# Patient Record
Sex: FEMALE | Race: WHITE | Employment: UNEMPLOYED | URBAN - METROPOLITAN AREA
[De-identification: names, ages, dates, MRNs, and addresses within clinical notes are randomized per-mention and may not be internally consistent; named-entity substitution may affect disease eponyms.]

---

## 2017-01-19 ENCOUNTER — HOSPITAL ENCOUNTER (EMERGENCY)
Facility: CLINIC | Age: 27
Discharge: ANOTHER HOSPITAL | End: 2017-01-19
Attending: EMERGENCY MEDICINE | Admitting: EMERGENCY MEDICINE
Payer: COMMERCIAL

## 2017-01-19 ENCOUNTER — HOSPITAL ENCOUNTER (INPATIENT)
Facility: CLINIC | Age: 27
LOS: 4 days | Discharge: HOME | DRG: 897 | End: 2017-01-23
Attending: PSYCHIATRY & NEUROLOGY | Admitting: PSYCHIATRY & NEUROLOGY
Payer: COMMERCIAL

## 2017-01-19 VITALS
HEIGHT: 67 IN | RESPIRATION RATE: 15 BRPM | OXYGEN SATURATION: 98 % | TEMPERATURE: 98.9 F | HEART RATE: 136 BPM | DIASTOLIC BLOOD PRESSURE: 92 MMHG | SYSTOLIC BLOOD PRESSURE: 139 MMHG

## 2017-01-19 DIAGNOSIS — F11.10 OPIATE ABUSE, CONTINUOUS (H): Primary | ICD-10-CM

## 2017-01-19 DIAGNOSIS — Z33.1 PREGNANT STATE, INCIDENTAL: ICD-10-CM

## 2017-01-19 DIAGNOSIS — F19.10 POLYSUBSTANCE ABUSE (H): ICD-10-CM

## 2017-01-19 LAB
ALBUMIN UR-MCNC: NEGATIVE MG/DL
AMPHETAMINES UR QL SCN: ABNORMAL
APPEARANCE UR: CLEAR
BARBITURATES UR QL: ABNORMAL
BENZODIAZ UR QL: ABNORMAL
BILIRUB UR QL STRIP: NEGATIVE
CANNABINOIDS UR QL SCN: ABNORMAL
COCAINE UR QL: ABNORMAL
COLOR UR AUTO: ABNORMAL
GLUCOSE UR STRIP-MCNC: NEGATIVE MG/DL
HGB UR QL STRIP: NEGATIVE
KETONES UR STRIP-MCNC: NEGATIVE MG/DL
LEUKOCYTE ESTERASE UR QL STRIP: NEGATIVE
NITRATE UR QL: NEGATIVE
OPIATES UR QL SCN: ABNORMAL
PCP UR QL SCN: ABNORMAL
PH UR STRIP: 6 PH (ref 5–7)
RBC #/AREA URNS AUTO: <1 /HPF (ref 0–2)
SP GR UR STRIP: 1 (ref 1–1.03)
URN SPEC COLLECT METH UR: ABNORMAL
UROBILINOGEN UR STRIP-MCNC: NORMAL MG/DL (ref 0–2)
WBC #/AREA URNS AUTO: <1 /HPF (ref 0–2)

## 2017-01-19 PROCEDURE — 12800008 ZZH R&B CD ADULT

## 2017-01-19 PROCEDURE — 25000132 ZZH RX MED GY IP 250 OP 250 PS 637: Performed by: EMERGENCY MEDICINE

## 2017-01-19 PROCEDURE — 12000001 ZZH R&B MED SURG/OB UMMC

## 2017-01-19 PROCEDURE — HZ2ZZZZ DETOXIFICATION SERVICES FOR SUBSTANCE ABUSE TREATMENT: ICD-10-PCS | Performed by: PSYCHIATRY & NEUROLOGY

## 2017-01-19 PROCEDURE — 81001 URINALYSIS AUTO W/SCOPE: CPT | Performed by: EMERGENCY MEDICINE

## 2017-01-19 PROCEDURE — 99285 EMERGENCY DEPT VISIT HI MDM: CPT

## 2017-01-19 PROCEDURE — 25000132 ZZH RX MED GY IP 250 OP 250 PS 637: Performed by: PSYCHIATRY & NEUROLOGY

## 2017-01-19 PROCEDURE — 80307 DRUG TEST PRSMV CHEM ANLYZR: CPT | Performed by: EMERGENCY MEDICINE

## 2017-01-19 PROCEDURE — 99223 1ST HOSP IP/OBS HIGH 75: CPT | Mod: AI | Performed by: PSYCHIATRY & NEUROLOGY

## 2017-01-19 RX ORDER — PRENATAL VIT/IRON FUM/FOLIC AC 27MG-0.8MG
1 TABLET ORAL DAILY
Status: DISCONTINUED | OUTPATIENT
Start: 2017-01-19 | End: 2017-01-23 | Stop reason: HOSPADM

## 2017-01-19 RX ORDER — BUPRENORPHINE 2 MG/1
2 TABLET SUBLINGUAL 4 TIMES DAILY PRN
Status: DISCONTINUED | OUTPATIENT
Start: 2017-01-19 | End: 2017-01-20

## 2017-01-19 RX ORDER — FOLIC ACID 1 MG/1
1 TABLET ORAL DAILY
Status: DISCONTINUED | OUTPATIENT
Start: 2017-01-19 | End: 2017-01-23 | Stop reason: HOSPADM

## 2017-01-19 RX ORDER — PRENATAL VIT/IRON FUM/FOLIC AC 27MG-0.8MG
1 TABLET ORAL DAILY
Status: ON HOLD | COMMUNITY
End: 2017-01-23

## 2017-01-19 RX ORDER — NALOXONE HYDROCHLORIDE 0.4 MG/ML
.1-.4 INJECTION, SOLUTION INTRAMUSCULAR; INTRAVENOUS; SUBCUTANEOUS
Status: DISCONTINUED | OUTPATIENT
Start: 2017-01-19 | End: 2017-01-23 | Stop reason: HOSPADM

## 2017-01-19 RX ADMIN — Medication 1 LOZENGE: at 09:24

## 2017-01-19 RX ADMIN — PRENATAL VIT W/ FE FUMARATE-FA TAB 27-0.8 MG 1 TABLET: 27-0.8 TAB at 15:05

## 2017-01-19 RX ADMIN — FOLIC ACID 1 MG: 1 TABLET ORAL at 15:05

## 2017-01-19 ASSESSMENT — ACTIVITIES OF DAILY LIVING (ADL)
ORAL_HYGIENE: INDEPENDENT
LAUNDRY: WITH SUPERVISION
LAUNDRY: WITH SUPERVISION
DRESS: SCRUBS (BEHAVIORAL HEALTH)
DRESS: INDEPENDENT
ORAL_HYGIENE: INDEPENDENT
GROOMING: INDEPENDENT
GROOMING: INDEPENDENT

## 2017-01-19 ASSESSMENT — ENCOUNTER SYMPTOMS
SHORTNESS OF BREATH: 0
ABDOMINAL PAIN: 0
COUGH: 1
FEVER: 0

## 2017-01-19 NOTE — PLAN OF CARE
Problem: Substance Withdrawal  Goal: Substance Withdrawal  Signs and symptoms of listed problems will be absent or manageable. 1) Patient will achieve medical stabilization of acute withdrawal sx.  2) Patient will remain safe and free from injury  3) Patient will demonstrate improvement of ADLs (appetite, hygiene)  4) Verbalize reduction of fear or anxiety to a manageable level.  5) Verbalize knowledge of substance abuse as a disease  6) Verbalize risks and negative effects related to drug ingestion  7) Demonstrate participation in unit programming and attends specific substance use group therapy (i.e AA meetings)  8) Accept referral to substance abuse treatment  9) Express sense of regaining some control of situation/life (possible by verbalizing alternative coping mechanisms as alternatives to substance use in response to stress)  Outcome: Declining  27 year old female admitted from [facility] ER on a transport hold. Pt is 17 weeks pregnant. Pt has been using heroin on the weekends x 1 month. Pt states she last used heroin 1-. Pt is poor historian on use amounts and history of use. Unable to ID amounts. Denies IV use. Smoke or snort.  Pt has also been using percoset and vicodin. Methamphetamine use off and on since age 18 years old. Utox + for amphetamines.  No history of previous detox's or CD treatments.   Denies MH history.   Pt denies SI/HI/SA.  Pt states she is motivated to get help and wants to go to treatment near her mother in [facility].   Pt was on taking amitriptyline and gabapentin. She has been told to no longer take these due to pregnancy.Pt has had one prenatal visit at 8 weeks pregnant.   Pt very restless during interview. Difficult to understand patients speech. Dr. Jones interviewed patient.   Plan: monitor mood withdrawal. See prn buprenorphine order. Ob consult. Status 15.
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
## 2017-01-19 NOTE — PROGRESS NOTES
I contacted Chica this pt's mother to recommend that this pt go to [facility] and make sure she completes any withdrawal before she goes home with her.  Chica did agree to this plan and agrees that it gives her more time to line up IP treatment in [place] for this pt after [facility].  I did assure this pt that her mom will come and get her and that this treatment plan is the best for her--this pt agrees and is fine that her mom will pick her up at [facility].  This pt kept voicing a concern about being safe at [facility] and I re-assured her that she will be safe at [facility]. Staff updated.
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
## 2017-01-19 NOTE — PLAN OF CARE
Problem: General Plan of Care (Inpatient Behavioral)  Goal: Individualization/Patient Specific Goal (IP Behavioral)  The patient and/or their representative will achieve their patient-specific goals related to the plan of care.    The patient-specific goals include: Patient will identify reason(s) for hospitalization from their perspective.  Patient will identify a goal for discharge.  Patient will identify triggers that may increase their risk for relapse.  Patient will identify coping skills they can do to stay well.   Patient will identify their support system to demonstrate readiness for discharge.  Outcome: No Change  Illnesses Management & Recovery  Patient identified being around drinking as trigger for relapse.  Patient identified keeping all of my outpatient appointments as a general wellness strategy.  Patient identified feeling cranky and being around using friends as a warning signs that they are in danger of relapse.  Patient identified Teofilo Brown and Chica Saez as someone they count on to get feedback from.  Patient identified being with supportive others as a way to take action when in danger of relapse.  Patient identified staying positive and keeping my appointment as  ways to cope with stress or other symptoms.

## 2017-01-19 NOTE — ED NOTES
"Patient voluntary came to ER seeking help for drug abuse in setting of pregnancy. Denies physical or emotional abuse but states \"I came here because I needed to be safe\". Writer asked why she felt unsafe but patient did not provide any information other than she is homeless living in a van or staying at house with lots of people. She admits to snorting heroin, abusing percocet, and taking her prescribed meds (gabapentin and amitriptyline) her OB doctor informed her not to. States they help with her anxiety more than anxiety meds. Patient appears very figidity but is cooperative and agreeable.  "

## 2017-01-19 NOTE — DISCHARGE INSTRUCTIONS
[employer] is a good resource for you and your children.  Please contact them at [phone].

## 2017-01-19 NOTE — IP AVS SNAPSHOT
[facility]    [address] [facility] [address]    Phone:  [phone]                                       After Visit Summary   1/19/2017    Adrianne Saez    MRN: [id number]           After Visit Summary Signature Page     I have received my discharge instructions, and my questions have been answered. I have discussed any challenges I see with this plan with the nurse or doctor.    ..........................................................................................................................................  Patient/Patient Representative Signature      ..........................................................................................................................................  Patient Representative Print Name and Relationship to Patient    ..................................................               ................................................  Date                                            Time    ..........................................................................................................................................  Reviewed by Signature/Title    ...................................................              ..............................................  Date                                                            Time

## 2017-01-19 NOTE — PROGRESS NOTES
I was asked if the ED provider can share this pt's ED record with her OB provider as this pt is pregnant and in the care of this OB provider. I did see by chart review that this pt came in looking for help with rule of 25 and a psych assessment.  She is currently approximately 8 weeks pregnant. I contacted patient relations re; the above and this pt freely signed our consent of service which includes sharing this ED visit with the appropriate provides also caring for this pt as a continuation of care.  I informed the ED provider.

## 2017-01-19 NOTE — IP AVS SNAPSHOT
MRN:[id number]                      After Visit Summary   1/19/2017    Adrianne Saez    MRN: [id number]           Thank you!     Thank you for choosing [facility] for your care. Our goal is always to provide you with excellent care.        Patient Information     Date Of Birth          1990        About your hospital stay     You were admitted on:  January 19, 2017 You last received care in the:  [facility]    You were discharged on:  January 23, 2017       Who to Call     For medical emergencies, please call 911.  For non-urgent questions about your medical care, please call your primary care provider or clinic, [phone]          Attending Provider     Provider    Vita Jones MD       Primary Care Provider Office Phone #    [facility] [phone]       No address on file        Your next 10 appointments already scheduled     Jan 30, 2017  7:30 PM   Treatment with [facility] ORIENTATION [facility] ([facility])    [address] [address] [phone]              Further instructions from your care team       Behavioral Discharge Planning and Instructions  THANK YOU FOR CHOOSING THE [facility]  3A  [phone]    Summary: You were admitted to Station 3A on 1/19/17 for detoxification from opiates.  A medical exam was performed that included lab work. You have met with a  and opted to continue in OP treatment and Suboxone maintenance.  Please take care and make your recovery a priority, Adrianne!     Suboxone maintenance:   Dr. Ramon Aquino  [facility]  [address] [place] [address]  Phone:  [phone]  Appointment:  Monday January 30th.  The time of the appointment is unknown.  If we cannot get the time prior to your discharge, you are to call the clinic tomorrow to confirm this.      Orientation for treatment at [facility]:    Monday January 30th at 7:30 PM  [address]  Room [address] (next door to [employer] [employer] [facility])  **If you are unable to attend orientation you must reschedule by calling [phone]** You MUST attend orientation before you can begin treatment.    You have been referred to [facility].  Please contact them with your new phone information at [phone].   [phone]      Main Diagnoses:  Per Dr. Jones:  1.  Opiate use disorder.    2.  Methamphetamine use disorder.    3.  Pregnancy.      Major Treatments, Procedures and Findings:  You were treated for opiate detoxification using Suboxone.  As an outpatient you will be prescribed Suboxone, please take this medication as prescribed until it is gone. You have had a chemical dependency assessment.  You have had labs drawn and those results have been reviewed with you. Please take a copy of your lab work with you to your next primary care physician appointment.    Symptoms to Report:  If you experience more anxiety, confusion, sleeplessness, deep sadness or thoughts of suicide, notify your treatment team or notify your primary care physician. IF ANY OF THE SYMPTOMS YOU ARE EXPERIENCING ARE A MEDICAL EMERGENCY CALL 911 IMMEDIATELY.     Lifestyle Adjustment: Adjust your lifestyle to get enough sleep, relaxation, exercise and good nutrition. Continue to develop healthy coping skills to decrease stress and promote a sober living environment. Do not use mood altering substances including alcohol, illegal drugs or addictive medications other than what is currently prescribed.     Follow-up Appointment:   [facility]   [address] [place] [address]  Phone:  [phone]  Appointment:  Monday January 30 th at 1:00 pm  Request a level 2 Ultrasound    Resources:    http://www.aastpaul.org/?topic=8  http://a[place].org/meetings/  http://www.nam[place].org/index.php/meeting-list-pdf  http://www.harmreduction.org  [facility] 899-501-2784  Support Group:  AA/NA and Sponsor/support  Crisis Intervention: 169.987.5571 or 237-998-6639 (TTY: 369.313.7832).  Call anytime for help.  National [place] on Mental Illness (www.[place].marie.org): 885.160.3277 or 928-909-8251.  Alcoholics Anonymous (www.alcoholics-anonymous.org): Check your phone book for your local chapter.  Suicide Awareness Voices of Education (SAVE) (www.save.org): 517-669-VMBW (7683)  National Suicide Prevention Line (www.mentalhealthmn.org): 400-743-VMWQ (7990)  Mental Health Consumer/Survivor Network of [place] (www.mhcsn.net): 215.456.8200 or 649-258-5239  Mental Health Association of [place] (www.mentalhealth.org): 512.849.2944 or 737-276-7116   Substance Abuse and Mental Health Services (www.samhsa.gov)    [place] Recovery Connection ([facility])  [facility] connects people seeking recovery to resources that help foster and sustain long-term recovery.  Whether you are seeking resources for treatment, transportation, housing, job training, education, health care or other pathways to recovery, [facility] is a great place to start.  447.159.7790.  www.[place]arecovery.org    General Medication Instructions:   See your medication sheet(s) for instructions.   Take all medicines as directed.  Make no changes unless your doctor suggests them.   Go to all your doctor visits.  Be sure to have all your required lab tests. This way, your medicines can be refilled on time.  AA/NA and Sponsors are excellent resources for support and you can find one at any support group meeting.  Do not use any form of alcohol.    Please Note:  If you have any questions at anytime after you are discharged please call the [facility] [place] detox unit 3AW unit at [phone].  [facility] , [facility], Behavioral  "Intake [phone]  Please take this discharge folder with you to all your follow up appointments, it contains your lab results, diagnosis, medication list and discharge recommendations.      THANK YOU FOR CHOOSING THE [facility]     The treatment team has appreciated the opportunity to work with you.  We wish you the best in the future.        Pending Results     No orders found from 2017 to 2017.            Statement of Approval     Ordered          17 1227  I have reviewed and agree with all the recommendations and orders detailed in this document.   EFFECTIVE NOW     Approved and electronically signed by:  Vita Jones MD             Admission Information        Provider Department Dept Phone    2017 Vita Jones MD Ur 3afh  [phone]      Your Vitals Were     Blood Pressure Pulse Temperature Respirations Height Weight    120/83 mmHg 94 98  F (36.7  C) (Oral) 16 1.676 m (5' 6\") 77.111 kg (170 lb)    BMI (Body Mass Index)                   27.45 kg/m2           [employer] Information     [employer] lets you send messages to your doctor, view your test results, renew your prescriptions, schedule appointments and more. To sign up, go to [email or web address] . Click on \"Log in\" on the left side of the screen, which will take you to the Welcome page. Then click on \"Sign up Now\" on the right side of the page.     You will be asked to enter the access code listed below, as well as some personal information. Please follow the directions to create your username and password.     Your access code is: [email or web address]  Expires: 2017  9:44 AM     Your access code will  in 90 days. If you need help or a new code, please call your [facility] clinic or [phone].        Care EveryWhere ID     This is your Care EveryWhere ID. This could be used by other organizations to access your [facility] medical records  [id number]           Review of your medicines    "   START taking        Dose / Directions    buprenorphine 2 MG Subl sublingual tablet   Commonly known as:  SUBUTEX   Used for:  Opiate abuse, continuous   Notes to Patient:  It has been given in the hospital at 8 am and 8 pm.        Dose:  2 mg   Place 1 tablet (2 mg) under the tongue daily   Quantity:  30 tablet   Refills:  0       folic acid 1 MG tablet   Commonly known as:  FOLVITE   Used for:  Opiate abuse, continuous        Dose:  1 mg   Take 1 tablet (1 mg) by mouth daily   Quantity:  30 tablet   Refills:  3         CONTINUE these medicines which have NOT CHANGED        Dose / Directions    NO ACTIVE MEDICATIONS        Refills:  0       prenatal multivitamin  plus iron 27-0.8 MG Tabs per tablet   Used for:  Opiate abuse, continuous        Dose:  1 tablet   Take 1 tablet by mouth daily   Quantity:  100 tablet   Refills:  1         STOP taking     AMITRIPTYLINE HCL PO           GABAPENTIN PO                Where to get your medicines      These medications were sent to [facility] [place], [place] - [address]  [address] [facility], [facility] [address]     Phone:  [phone]    - folic acid 1 MG tablet  - prenatal multivitamin  plus iron 27-0.8 MG Tabs per tablet      Some of these will need a paper prescription and others can be bought over the counter. Ask your nurse if you have questions.     Bring a paper prescription for each of these medications    - buprenorphine 2 MG Subl sublingual tablet             Protect others around you: Learn how to safely use, store and throw away your medicines at www.disposemymeds.org.             Medication List: This is a list of all your medications and when to take them. Check marks below indicate your daily home schedule. Keep this list as a reference.      Medications           Morning Afternoon Evening Bedtime As Needed    buprenorphine 2 MG Subl sublingual tablet   Commonly known as:  SUBUTEX   Place 1 tablet (2 mg) under the tongue daily    Last time this was given:  2 mg on 1/23/2017  9:50 AM   Notes to Patient:  It has been given in the hospital at 8 am and 8 pm.                                      folic acid 1 MG tablet   Commonly known as:  FOLVITE   Take 1 tablet (1 mg) by mouth daily   Last time this was given:  1 mg on 1/23/2017  8:24 AM                                   NO ACTIVE MEDICATIONS                                prenatal multivitamin  plus iron 27-0.8 MG Tabs per tablet   Take 1 tablet by mouth daily   Last time this was given:  1 tablet on 1/23/2017  8:24 AM

## 2017-01-19 NOTE — H&P
IDENTIFYING INFORMATION:  Adrianne Saez is a 27-year-old  female.  She is pregnant.  The patient is a 27-year-old  female, currently unemployed, homeless.  She has 2 children under the custody of ex-boyfriend's mother.      CHIEF COMPLAINT:  Heroin.      HISTORY OF PRESENT ILLNESS:  The patient is an extremely poor historian.  She came here to get help.  She has a longstanding history of abusing opiates, meth.  She has been using opiates for 5-6 years, mostly she is using Percocet and then has been using heroin.  Heroin she has been using it every weekend.  She snorts it.  She has tolerance to it.  She has tried to quit it.  She lost control over it.  She has used despite knowing that she is pregnant.  She is vague about amounts and frequency, saying she does not know how much she gets because her boyfriend supplies it for her.  She is also using meth, started at the age of 18 or 19.  She is vague about the amounts and frequency.  Her use has been progressive.  She has tried to quit using.  She does not use any drugs IV.  She has not drank alcohol since Christmas of 2015.  She uses pot occasionally.      The patient does not have any suicidal or homicidal ideation, plan or intent.  Does not have auditory or visual hallucinations, does not have any depression, does not have any rona, does not have any anxiety.  She takes gabapentin and Elavil even though told by her primary care not to take it.  The patient has been snorting heroin on weekends.  Last use was yesterday.  She has also been taking gabapentin for carpal tunnel syndrome.      PAST PSYCHIATRIC HISTORY:  Never psychiatrically hospitalized, never had any chemical dependency treatments, never made suicide attempts, never had any psychiatric issues.      PAST MEDICAL HISTORY:   She is pregnant and she has a history of hernia repair.      The patient's vitals are as below:   VITAL SIGNS:  Temperature of 98.5, pulse of 102, respiratory rate 16,  blood pressure of 143/91.      FAMILY HISTORY:  Mother has depression, dad  from drug overdose.      SOCIAL HISTORY:  She was born in [place].  She says she dropped out of 8th grade.  She is presently homeless, unemployed.  CPS has been involved.      MENTAL STATUS EXAMINATION:  The patient is a 27-year-old  female who appears under the influence.  She is disheveled, adequate grooming, adequate hygiene, maintains good eye contact, irritable.  Affect is congruent.  Speech is spontaneous.  Speech is low volume and mumbles her words, less logical in thinking, no loose association.  Insight and judgment are limited.  Alert and oriented x3.  Recent and remote memory, language, fund of knowledge are all less than adequate.  Does not have any suicidal or homicidal ideation, plan or intent.      DIAGNOSES:   Axis I:     1.  Opiate use disorder.   2.  Methamphetamine use disorder.   3.  Pregnancy.      PLAN:  The patient will be detoxed off meth   U-tox was positive for meth, symptomatic management.  The patient will be seen by case management and Internal Medicine.  Ob consultconsult and Internal Medicine consult.  We will put her on Subutex.  The patient may need it.  The patient is willing to do treatment.  We will try to get collateral information and go from there.         CELESTE JONES MD             D: 2017 14:39   T: 2017 16:40   MT: ROSALIND      Name:     SANDER LUCIANO   MRN:      [id number]        Account:      [id number]   :      1990           Admitted:     [id number]      Document: [id number]

## 2017-01-20 LAB
ALBUMIN SERPL-MCNC: 2.7 G/DL (ref 3.4–5)
ALP SERPL-CCNC: 68 U/L (ref 40–150)
ALT SERPL W P-5'-P-CCNC: 30 U/L (ref 0–50)
ANION GAP SERPL CALCULATED.3IONS-SCNC: 6 MMOL/L (ref 3–14)
AST SERPL W P-5'-P-CCNC: 19 U/L (ref 0–45)
BASOPHILS # BLD AUTO: 0 10E9/L (ref 0–0.2)
BASOPHILS NFR BLD AUTO: 0.2 %
BILIRUB SERPL-MCNC: 0.3 MG/DL (ref 0.2–1.3)
BUN SERPL-MCNC: 7 MG/DL (ref 7–30)
CALCIUM SERPL-MCNC: 8.6 MG/DL (ref 8.5–10.1)
CHLORIDE SERPL-SCNC: 109 MMOL/L (ref 94–109)
CO2 SERPL-SCNC: 25 MMOL/L (ref 20–32)
CREAT SERPL-MCNC: 0.66 MG/DL (ref 0.52–1.04)
DIFFERENTIAL METHOD BLD: ABNORMAL
EOSINOPHIL # BLD AUTO: 0.1 10E9/L (ref 0–0.7)
EOSINOPHIL NFR BLD AUTO: 1.4 %
ERYTHROCYTE [DISTWIDTH] IN BLOOD BY AUTOMATED COUNT: 12.9 % (ref 10–15)
GFR SERPL CREATININE-BSD FRML MDRD: ABNORMAL ML/MIN/1.7M2
GGT SERPL-CCNC: 9 U/L (ref 0–40)
GLUCOSE SERPL-MCNC: 76 MG/DL (ref 70–99)
HCT VFR BLD AUTO: 34.8 % (ref 35–47)
HGB BLD-MCNC: 11.9 G/DL (ref 11.7–15.7)
IMM GRANULOCYTES # BLD: 0 10E9/L (ref 0–0.4)
IMM GRANULOCYTES NFR BLD: 0.2 %
LYMPHOCYTES # BLD AUTO: 2.5 10E9/L (ref 0.8–5.3)
LYMPHOCYTES NFR BLD AUTO: 27.3 %
MCH RBC QN AUTO: 30.1 PG (ref 26.5–33)
MCHC RBC AUTO-ENTMCNC: 34.2 G/DL (ref 31.5–36.5)
MCV RBC AUTO: 88 FL (ref 78–100)
MONOCYTES # BLD AUTO: 0.7 10E9/L (ref 0–1.3)
MONOCYTES NFR BLD AUTO: 7.9 %
NEUTROPHILS # BLD AUTO: 5.7 10E9/L (ref 1.6–8.3)
NEUTROPHILS NFR BLD AUTO: 63 %
NRBC # BLD AUTO: 0 10*3/UL
NRBC BLD AUTO-RTO: 0 /100
PLATELET # BLD AUTO: 306 10E9/L (ref 150–450)
POTASSIUM SERPL-SCNC: 3.5 MMOL/L (ref 3.4–5.3)
PROT SERPL-MCNC: 6.4 G/DL (ref 6.8–8.8)
RBC # BLD AUTO: 3.95 10E12/L (ref 3.8–5.2)
SODIUM SERPL-SCNC: 140 MMOL/L (ref 133–144)
TSH SERPL DL<=0.005 MIU/L-ACNC: 1.18 MU/L (ref 0.4–4)
WBC # BLD AUTO: 9 10E9/L (ref 4–11)

## 2017-01-20 PROCEDURE — 25900015 H RX 259: Performed by: PSYCHIATRY & NEUROLOGY

## 2017-01-20 PROCEDURE — 80053 COMPREHEN METABOLIC PANEL: CPT | Performed by: PSYCHIATRY & NEUROLOGY

## 2017-01-20 PROCEDURE — 82977 ASSAY OF GGT: CPT | Performed by: PSYCHIATRY & NEUROLOGY

## 2017-01-20 PROCEDURE — 25000132 ZZH RX MED GY IP 250 OP 250 PS 637: Performed by: PSYCHIATRY & NEUROLOGY

## 2017-01-20 PROCEDURE — 12800008 ZZH R&B CD ADULT

## 2017-01-20 PROCEDURE — 99232 SBSQ HOSP IP/OBS MODERATE 35: CPT | Performed by: PSYCHIATRY & NEUROLOGY

## 2017-01-20 PROCEDURE — H0001 ALCOHOL AND/OR DRUG ASSESS: HCPCS

## 2017-01-20 PROCEDURE — 85025 COMPLETE CBC W/AUTO DIFF WBC: CPT | Performed by: PSYCHIATRY & NEUROLOGY

## 2017-01-20 PROCEDURE — 84443 ASSAY THYROID STIM HORMONE: CPT | Performed by: PSYCHIATRY & NEUROLOGY

## 2017-01-20 PROCEDURE — 99221 1ST HOSP IP/OBS SF/LOW 40: CPT | Performed by: PHYSICIAN ASSISTANT

## 2017-01-20 PROCEDURE — 36415 COLL VENOUS BLD VENIPUNCTURE: CPT | Performed by: PSYCHIATRY & NEUROLOGY

## 2017-01-20 RX ORDER — BUPRENORPHINE 2 MG/1
2 TABLET SUBLINGUAL 4 TIMES DAILY
Status: DISCONTINUED | OUTPATIENT
Start: 2017-01-20 | End: 2017-01-21

## 2017-01-20 RX ORDER — ONDANSETRON 4 MG/1
4 TABLET, ORALLY DISINTEGRATING ORAL EVERY 6 HOURS PRN
Status: DISCONTINUED | OUTPATIENT
Start: 2017-01-20 | End: 2017-01-23 | Stop reason: HOSPADM

## 2017-01-20 RX ORDER — NICOTINE 21 MG/24HR
1 PATCH, TRANSDERMAL 24 HOURS TRANSDERMAL DAILY
Status: DISCONTINUED | OUTPATIENT
Start: 2017-01-20 | End: 2017-01-23 | Stop reason: HOSPADM

## 2017-01-20 RX ADMIN — FOLIC ACID 1 MG: 1 TABLET ORAL at 08:47

## 2017-01-20 RX ADMIN — BUPRENORPHINE HCL 2 MG: 2 TABLET SUBLINGUAL at 10:03

## 2017-01-20 RX ADMIN — PRENATAL VIT W/ FE FUMARATE-FA TAB 27-0.8 MG 1 TABLET: 27-0.8 TAB at 08:47

## 2017-01-20 RX ADMIN — BUPRENORPHINE HCL 2 MG: 2 TABLET SUBLINGUAL at 16:33

## 2017-01-20 RX ADMIN — NICOTINE 1 PATCH: 21 PATCH, EXTENDED RELEASE TRANSDERMAL at 14:14

## 2017-01-20 ASSESSMENT — ACTIVITIES OF DAILY LIVING (ADL)
DRESS: INDEPENDENT
GROOMING: INDEPENDENT
GROOMING: INDEPENDENT
DRESS: INDEPENDENT
ORAL_HYGIENE: INDEPENDENT
ORAL_HYGIENE: INDEPENDENT
LAUNDRY: WITH SUPERVISION

## 2017-01-20 NOTE — PROGRESS NOTES
"Rule 25 Assessment  Background Information   1. Date of Assessment Request  2. Date of Assessment  2017  3. Date Service Authorized     4.   Silvina Lawson MS    5.  Phone Number   [phone]  6. Referent  Self 7. Assessment Site  [facility]     8. Client Name   Adrianne Saez 9. Date of Birth  1990 Age  27 year old 10. Gender  female  11. PMI/ Insurance No.  Payor: [employer] / Plan: [facility] / Product Type: PPO /   [id number]   12. Client's Primary Language:  English 13. Do you require special accommodations, such as an  or assistance with written material? No   14. Current Address: [address] [place] [address]   15. Client Phone Numbers: [phone]     16. Tell me what has happened to bring you here today.    \"Want treatment\"    Per ED note dated 17:  Adrianne Saez is a  27 year old female who presents requesting a psychiatric evaluation. The patient comes into the ED this morning requesting a rule 25 or psychiatric assessment. She reports that she is pregnant at this time, approximately 17w5d by LMP. She last saw her OB/GYN at approximately 8 weeks gestation. The patient reports that she has been snorting heroin \"not very often...on weekends\" lately and last used heroin yesterday. She additionally reports that she has been taking Percocet, Amitriptyline, and Gabapentin even though her OB told her not to because she cannot handle her anxiety and carpel tunnel without these medications. She took her Amitriptyline this morning, but is unable to recall at what time. The patient denies any suicidal or homicidal ideation and reports no other substance use today, including no methamphetamine or alcohol use. She does smoke cigarettes at this time.     She additionally complains of cold-like symptoms today, noting an intermittent cough and nasal congestion since 2017. She has been " "using cough drops for symptomatic relief. She reports no measured fevers and no chest pain, abdominal pain, or shortness of breath. She was recently staying with her ex-boyfriend's wife and her , and states that a cold is going around their house. Over the past couple of days, she has been living alone in her van.       17. Have you had other rule 25 assessments?     No    DIMENSION I - Acute Intoxication /Withdrawal Potential   1. Chemical use most recent 12 months outside a facility and other significant use history (client self-report)              X = Primary Drug Used   Age of First Use Most Recent Pattern of Use and Duration   Need enough information to show pattern (both frequency and amounts) and to show tolerance for each chemical that has a diagnosis   Date of last use and time, if needed   Withdrawal Potential? Requiring special care Method of use  (oral, smoked, snort, IV, etc)      Alcohol     14  No use for the last year   12/2016        Marijuana/  Hashish   14  no use in over 2 years         Cocaine/Crack     N/A           Meth/  Amphetamines   18 Pt unable to clarify amounts   On and off use  \"enough to want and need more\" 1/18/17  Smokes  snorts   x   Heroin     25 using weekends x 1 month   Sharing around $100 worth  1/18/17  Smokes  snorts   x   Other Opiates/  Synthetics   15 uses percoset and vicodin unknown amoutns Beginning of the month        Inhalants     N/A           Benzodiazepines     N/A           Hallucinogens     N/A           Barbiturates/  Sedatives/  Hypnotics N/A           Over-the-Counter Drugs   N/A           Other     N/A           Nicotine     13 1/2 ppd  1/19/17  smokes     2. Do you use greater amounts of alcohol/other drugs to feel intoxicated or achieve the desired effect?  Yes.  Or use the same amount and get less of an effect?  Yes.  Example: Pt endorses increased use, tolerance, and withdrawal    3A. Have you ever been to detox?     Yes    3B. When was the first " time?     current    3C. How many times since then?     NA    3D. Date of most recent detox:     current    4.  Withdrawal symptoms: Have you had any of the following withdrawal symptoms?  Past 12 months Recent (past 30 days)   None Fatigue / Extremely Tired  Muscle Aches  Irritability  Diminished Appetite     's Visual Observations and Symptoms: Pt is being treated for withdrawal and appears comfortable.    Based on the above information, is withdrawal likely to require attention as part of treatment participation?  No    Dimension I Ratings   Acute intoxication/Withdrawal potential - The placing authority must use the criteria in Dimension I to determine a client s acute intoxication and withdrawal potential.    RISK DESCRIPTIONS - Severity ratin Client can tolerate and cope with withdrawal discomfort. The client displays mild to moderate intoxication or signs and symptoms interfering with daily functioning but does not immediately endanger self or others. Client poses minimal risk of severe withdrawal.    REASONS SEVERITY WAS ASSIGNED (What about the amount of the person s use and date of most recent use and history of withdrawal problems suggests the potential of withdrawal symptoms requiring professional assistance? )      The patient's withdrawal symptomology was identified, managed and addressed by [facility] Detox Medical Team. Pt reports that her last use of heroin and methamphetamine was on 17. Pt was given a UA at time of ER admit and the UA was POS for amphetamine. **Mother reports heavy meth use for last 6 years.       DIMENSION II - Biomedical Complications and Conditions   1. Do you have any current health/medical conditions?(Include any infectious diseases, allergies, or chronic or acute pain, history of chronic conditions)       No    2. Do you have a health care provider? When was your most recent appointment? What concerns were identified?     No current PCP    3. If indicated by  answers to items 1 or 2: How do you deal with these concerns? Is that working for you? If you are not receiving care for this problem, why not?      NA    4A. List current medication(s) including over-the-counter or herbal supplements--including pain management:     Prior to Admission medications    Medication Sig Start Date End Date Taking? Authorizing Provider   GABAPENTIN PO Take 300 mg by mouth 3 times daily    Yes Reported, Patient   AMITRIPTYLINE HCL PO Take 50 mg by mouth At Bedtime    Yes Reported, Patient   Prenatal Vit-Fe Fumarate-FA (PRENATAL MULTIVITAMIN  PLUS IRON) 27-0.8 MG TABS per tablet Take 1 tablet by mouth daily   Yes Reported, Patient   NO ACTIVE MEDICATIONS     Reported, Patient        4B. Do you follow current medical recommendations/take medications as prescribed?     No, please explain: Was told to stop meds due to pregnancy but did not stop them.  Needs education.    4C. When did you last take your medication?     17    5. Has a health care provider/healer ever recommended that you reduce or quit alcohol/drug use?     Yes    6. Are you pregnant?     Yes.  6B. Receiving prenatal care?  yes.   6C. When is your baby due? 17    7. Have you had any injuries, assaults/violence towards you, accidents, health related issues, overdose(s) or hospitalizations related to your use of alcohol or other drugs:     No    8. Do you have any specific physical needs/accommodations? No    Dimension II Ratings   Biomedical Conditions and Complications - The placing authority must use the criteria in Dimension II to determine a client s biomedical conditions and complications.   RISK DESCRIPTIONS - Severity ratin Client tolerates and faby with physical discomfort and is able to get the services that the client needs.    REASONS SEVERITY WAS ASSIGNED (What physical/medical problems does this person have that would inhibit his or her ability to participate in treatment? What issues does he or she  have that require assistance to address?)    No current PCP.  No chronic medical issues.  17 weeks pregnant with a due date of 2017.  Able to navigate the health care system.         DIMENSION III - Emotional, Behavioral, Cognitive Conditions and Complications   1. (Optional) Tell me what it was like growing up in your family. (substance use, mental health, discipline, abuse, support)     Father was an addict.  He  about 5 years ago.  Mother is supportive.  Felt supported 50% of the time growing up.  Denies abuse.    2. When was the last time that you had significant problems...  A. with feeling very trapped, lonely, sad, blue, depressed or hopeless  about the future? Never    B. with sleep trouble, such as bad dreams, sleeping restlessly, or falling  asleep during the day? 1+ years ago    C. with feeling very anxious, nervous, tense, scared, panicked, or like  something bad was going to happen? Never    D. with becoming very distressed and upset when something reminded  you of the past? Never    E. with thinking about ending your life or committing suicide? Never    3. When was the last time that you did the following things two or more times?  A. Lied or conned to get things you wanted or to avoid having to do  something? Never    B. Had a hard time paying attention at school, work, or home? Never    C. Had a hard time listening to instructions at school, work, or home? Never    D. Were a bully or threatened other people? Never    E. Started physical fights with other people? Never    Note: These questions are from the Global Appraisal of Individual Needs--Short Screener. Any item marked  past month  or  2 to 12 months ago  will be scored with a severity rating of at least 2.     For each item that has occurred in the past month or past year ask follow up questions to determine how often the person has felt this way or has the behavior occurred? How recently? How has it affected their daily living?  "And, whether they were using or in withdrawal at the time?    \"I have felt sad before but never depressed or lonely.  I was diagnosed with anxiety 2 years ago but I never felt it was bad.\"    4A. If the person has answered item 2E with  in the past year  or  the past month , ask about frequency and history of suicide in the family or someone close and whether they were under the influence.     NA    Any history of suicide in your family? Or someone close to you?     No    4B. If the person answered item 2E  in the past month  ask about  intent, plan, means and access and any other follow-up information  to determine imminent risk. Document any actions taken to intervene  on any identified imminent risk.      NA    5A. Have you ever been diagnosed with a mental health problem?     No    5B. Are you receiving care for any mental health issues? If yes, what is the focus of that care or treatment?  Are you satisfied with the service? Most recent appointment?  How has it been helpful?     No     6. Have you been prescribed medications for emotional/psychological problems?     No    7. Does your  provider know about your use?     NA    8A. Have you ever been verbally, emotionally, physically or sexually abused?      No     Follow up questions to learn current risk, continuing emotional impact.      NA    8B. Have you received counseling for abuse?      N/A    9. Have you ever experienced or been part of a group that experienced community violence, historical trauma, rape or assault?     No    10A. :    No    11. Do you have problems with any of the following things in your daily life?    No    Note: If the person has any of the above problems, follow up with items 12, 13, and 14. If none of the issues in item 11 are a problem for the person, skip to item 15.        12. Have you been diagnosed with traumatic brain injury or Alzheimer s?      13. If the answer to #12 is no, ask the following questions:    Have you " ever hit your head or been hit on the head?     Were you ever seen in the Emergency Room, hospital or by a doctor because of an injury to your head?     Have you had any significant illness that affected your brain (brain tumor, meningitis, West Nile Virus, stroke or seizure, heart attack, near drowning or near suffocation)?     14. If the answer to #12 is yes, ask if any of the problems identified in #11 occurred since the head injury or loss of oxygen.     15A. Highest grade of school completed:     9th grade    15B. Do you have a learning disability? No    15C. Did you ever have tutoring in Math or English? Yes    15D. Have you ever been diagnosed with Fetal Alcohol Effects or Fetal Alcohol Syndrome? No    16. If yes to item 15 B, C, or D: How has this affected your use or been affected by your use?     NA    Dimension III Ratings   Emotional/Behavioral/Cognitive - The placing authority must use the criteria in Dimension III to determine a client s emotional, behavioral, and cognitive conditions and complications.   RISK DESCRIPTIONS - Severity ratin Client has impulse control and coping skills. Client presents a mild to moderate risk of harm to self or others or displays symptoms of emotional, behavioral or cognitive problems. Client has a mental health diagnosis and is stable. Client functions adequately in significant life areas.    REASONS SEVERITY WAS ASSIGNED - What current issues might with thinking, feelings or behavior pose barriers to participation in a treatment program? What coping skills or other assets does the person have to offset those issues? Are these problems that can be initially accommodated by a treatment provider? If not, what specialized skills or attributes must a provider have?    Pt reports that her childhood was ok and felt supported 50% of the time while growing up. Pt reports having 2 siblings. Pt denies a history of abuse. Pt denies SIB/SA/HI/HA at this time. Patient denies  "having any formal MH diagnoses and denies taking any medications for MH.    **Mother reports serious anxiety disorder and cognitive problems.         DIMENSION IV - Readiness for Change   1. You ve told me what brought you here today. (first section) What do you think the problem really is?     \"I need treatment\"    2. Tell me how things are going. Ask enough questions to determine whether the person has use related problems or assets that can be built upon in the following areas: Family/friends/relationships; Legal; Financial; Emotional; Educational; Recreational/ leisure; Vocational/employment; Living arrangements (DSM)      Relationships: I don't have any besides my bf and my mom.  Legal: Court dates in [place] on Jan 30th for possession of marijuana and needles.  Has not been charged yet.  Hx of quijano misdemeanors.  Financial: In debt  Emotional: denies problems.  Feels disappointed in herself.    Education: 9th grade  Leisure:   Employment: unemployed.  Boyfriend supports her.   Living Arrangements: was living in [place].  Had to give her ex temporary custody of her two kids because she was in transition. Plans to live with her sister in law.        3. What activities have you engaged in when using alcohol/other drugs that could be hazardous to you or others (i.e. driving a car/motorcycle/boat, operating machinery, unsafe sex, sharing needles for drugs or tattoos, etc     No activities.  I usually just stay home.    4. How much time do you spend getting, using or getting over using alcohol or drugs? (DSM)     \"not much\"      5. Reasons for drinking/drug use (Use the space below to record answers. It may not be necessary to ask each item.)  Like the feeling Yes   Trying to forget problems No   To cope with stress No   To relieve physical pain No   To cope with anxiety No   To cope with depression No   To relax or unwind Yes   Makes it easier to talk with people No   Partner encourages use No   Most friends drink or use " "N/A   To cope with family problems No   Afraid of withdrawal symptoms/to feel better Yes   Other (specify)  \"Just so used of it\"     A. What concerns other people about your alcohol or drug use/Has anyone told you that you use too much? What did they say? (DSM)     \"My BF is concerned because he wants me to able to work with him and get our family back and be healthy.  He is concerned because I am carrying his child.  My mom is probably thinking the same thing.\"    B. What did you think about that/ do you think you have a problem with alcohol or drug use?     \"I do.\"    6. What changes are you willing to make? What substance are you willing to stop using? How are you going to do that? Have you tried that before? What interfered with your success with that goal?      \"I'm willing to try what ever it takes.\"    7. What would be helpful to you in making this change?     \"Having support\"    Dimension IV Ratings   Readiness for Change - The placing authority must use the criteria in Dimension IV to determine a client s readiness for change.   RISK DESCRIPTIONS - Severity ratin Client is motivated with active reinforcement, to explore treatment and strategies for change, but ambivalent about illness or need for change.    REASONS SEVERITY WAS ASSIGNED - (What information did the person provide that supports your assessment of his or her readiness to change? How aware is the person of problems caused by continued use? How willing is she or he to make changes? What does the person feel would be helpful? What has the person been able to do without help?)      Patient displays verbal compliance and motivation but lacks consistent behaviors and follow-through. Pt has continued to use despite being pregnant. Pt appears to be in the \"contemplation\" Stage within the Stages of Change Model.            DIMENSION V - Relapse, Continued Use, and Continued Problem Potential   1. In what ways have you tried to control, cut-down or " "quit your use? If you have had periods of sobriety, how did you accomplish that? What was helpful? What happened to prevent you from continuing your sobriety? (DSM)     Has tried to stay away from others who use.  Can quit for a couple months and then I relapse when I decide to go out and party with others.    2. Have you experienced cravings? If yes, ask follow up questions to determine if the person recognizes triggers and if the person has had any success in dealing with them.     Endorses cravings.  Identifies being offered as a trigger.    3. Have you been treated for alcohol/other drug abuse/dependence?     No    4. Support group participation: Have you/do you attend support group meetings to reduce/stop your alcohol/drug use? How recently? What was your experience? Are you willing to restart? If the person has not participated, is he or she willing?     None    5. What would assist you in staying sober/straight?     \"Going to treatment and moving away from the people I usually hang with.\"    Dimension V Ratings   Relapse/Continued Use/Continued problem potential - The placing authority must use the criteria in Dimension V to determine a client s relapse, continued use, and continued problem potential.   RISK DESCRIPTIONS - Severity rating: 3- moderate to high relapse risk without Suboxone maintenance.    REASONS SEVERITY WAS ASSIGNED - (What information did the person provide that indicates his or her understanding of relapse issues? What about the person s experience indicates how prone he or she is to relapse? What coping skills does the person have that decrease relapse potential?)      Pt lacks insight into her personal relapse process along with early warning signs and triggers. Pt lacks impulse control, sober coping skills and long-term sober maintenance skills. Pt lacks insight into the effects her use has had on her physical and mental health. Pt is at a high risk for relapse/continued use.    Patient " "denies having been involved in any past treatments, detox admissions and has had nominal 12-step support group participation.    Has some family support from members who are in recovery.         DIMENSION VI - Recovery Environment   1. Are you employed/attending school? Tell me about that.     No    2A. Describe a typical day; evening for you. Work, school, social, leisure, volunteer, spiritual practices. Include time spent obtaining, using, recovering from drugs or alcohol. (DSM)     Get up, used to hang with kids and donate plasma twice per week.  Use occurs when the kids aren't around.  Mostly on weekends.    2B. How often do you spend more time than you planned using or use more than you planned? (DSM)     Weekends.    3. How important is using to your social connections? Do many of your family or friends use?     \"My family don't use.\"  I don't have friends.    4A. Are you currently in a significant relationship?     Yes    4C. Sexual Orientation:     Heterosexual    5A. Who do you live with?      Was in transition.  Will be living with Sister in-law and others in recovery    5B. Tell me about their alcohol/drug use and mental health issues.     NA    5C. Are you concerned for your safety there? No    5D. Are you concerned about the safety of anyone else who lives with you? No    6A. Do you have children who live with you?     No    6B. Do you have children who do not live with you?     2 kids who live with their father currently.  Pt is 17 weeks pregnant    7A. Who supports you in making changes in your alcohol or drug use? What are they willing to do to support you? Who is upset or angry about you making changes in your alcohol or drug use? How big a problem is this for you?      Mother is supportive.    7B. This table is provided to record information about the person s relationships and available support It is not necessary to ask each item; only to get a comprehensive picture of their support system.  How " often can you count on the following people when you need someone?   Partner / Spouse Always supportive   Parent(s)/Aunt(s)/Uncle(s)/Grandparents Always supportive   Sibling(s)/Cousin(s) N/A   Child(chase) N/A   Other relative(s) Some are supportive   Friend(s)/neighbor(s) N/A   Child(chase) s father(s)/mother(s) N/A   Support group member(s) N/A   Community of nayan members N/A   /counselor/therapist/healer N/A   Other (specify) N/A     8A. What is your current living situation?     In the process of moving in with Sister in-law.    8B. What is your long term plan for where you will be living?     Planning to live with her Sister in law    8C. Tell me about your living environment/neighborhood? Ask enough follow up questions to determine safety, criminal activity, availability of alcohol and drugs, supportive or antagonistic to the person making changes.      No concerns     9. Criminal justice history: Gather current/recent history and any significant history related to substance use--Arrests? Convictions? Circumstances? Alcohol or drug involvement? Sentences? Still on probation or parole? Expectations of the court? Current court order? Any sex offenses - lifetime? What level? (DSM)    On probation in [place].    10. What obstacles exist to participating in treatment? (Time off work, childcare, funding, transportation, pending senior care time, living situation)     None    Dimension VI Ratings   Recovery environment - The placing authority must use the criteria in Dimension VI to determine a client s recovery environment.   RISK DESCRIPTIONS - Severity rating: 3 - Pt has some sober support.      REASONS SEVERITY WAS ASSIGNED - (What support does the person have for making changes? What structure/stability does the person have in his or her daily life that will increase the likelihood that changes can be sustained? What problems exist in the person s environment that will jeopardize getting/staying clean and sober?)      Pt reports that she is currently living in a sober environment with her sister in law.   Pt reports that she lacks a daily structure and meaningful activities. Pt reports that she is currently unemployed and is not attending school at this time. Pt reports fracturing relationships with family and friends due to her use. Pt lacks a sober support network. Pt is on probation in [place].  Pt has been referred to [facility].           Client Choice/Exceptions   Would you like services specific to language, age, gender, culture, Catholic preference, race, ethnicity, sexual orientation or disability?  No    What particular treatment choices and options would you like to have? NA    Do you have a preference for a particular treatment program? [facility]    Criteria for Diagnosis     Criteria for Diagnosis  DSM-5 Criteria for Substance Use Disorder  Instructions: Determine whether the client currently meets the criteria for Substance Use Disorder using the diagnostic criteria in the DSM-V pp.481-589. Current means during the most recent 12 months outside a facility that controls access to substances    Category of Substance Severity (ICD-10 Code / DSM 5 Code)     Alcohol Use Disorder NA   Cannabis Use Disorder NA   Hallucinogen Use Disorder NA   Inhalant Use Disorder NA   Opioid Use Disorder Severe   (F11.20) (304.00)   Sedative, Hypnotic, or Anxiolytic Use Disorder NA   Stimulant Related Disorder Severe   (F15.20) (304.40) Amphetamine type substance   Tobacco Use Disorder Moderate   (F17.200) (305.1)   Other (or unknown) Substance Use Disorder NA       Collateral Contact Summary   Number of contacts made: 2    Contact with referring person:  NA.    If court related records were reviewed, summarize here: NA    Information from collateral contacts was significantly different from information from the client and lead to different risk ratings. Summarize here: Pt underreports her use history and problems related to  her use.      Rule 25 Assessment Summary and Plan   's Recommendation    Minimally participate in an outpatient treatment program  Suboxone maintenance at least through pregnancy  Follow the recommendations of your program  Keep appointments with providers  Take medications as prescribed  Support group participation with a female sponsor.        Collateral Contacts     Name:    [facility]   Relationship:       Phone Number:     Releases:         IDENTIFYING INFORMATION:  Adrianne Saez is a 27-year-old  female.  She is pregnant.  The patient is a 27-year-old  female, currently unemployed, homeless.  She has 2 children under the custody of ex-boyfriend's mother.        CHIEF COMPLAINT:  Heroin.        HISTORY OF PRESENT ILLNESS:  The patient is an extremely poor historian.  She came here to get help.  She has a longstanding history of abusing opiates, meth.  She has been using opiates for 5-6 years, mostly she is using Percocet and then has been using heroin.  Heroin she has been using it every weekend.  She snorts it.  She has tolerance to it.  She has tried to quit it.  She lost control over it.  She has used despite knowing that she is pregnant.  She is vague about amounts and frequency, saying she does not know how much she gets because her boyfriend supplies it for her.  She is also using meth, started at the age of 18 or 19.  She is vague about the amounts and frequency.  Her use has been progressive.  She has tried to quit using.  She does not use any drugs IV.  She has not drank alcohol since Christmas of 2015.  She uses pot occasionally.        The patient does not have any suicidal or homicidal ideation, plan or intent.  Does not have auditory or visual hallucinations, does not have any depression, does not have any rona, does not have any anxiety.  She takes gabapentin and Elavil even though told by her primary care not to take it.  The patient has been snorting heroin on weekends.   Last use was yesterday.  She has also been taking gabapentin for carpal tunnel syndrome.        PAST PSYCHIATRIC HISTORY:  Never psychiatrically hospitalized, never had any chemical dependency treatments, never made suicide attempts, never had any psychiatric issues.        PAST MEDICAL HISTORY:   She is pregnant and she has a history of hernia repair.        The patient's vitals are as below:    VITAL SIGNS:  Temperature of 98.5, pulse of 102, respiratory rate 16, blood pressure of 143/91.        FAMILY HISTORY:  Mother has depression, dad  from drug overdose.        SOCIAL HISTORY:  She was born in [place].  She says she dropped out of 8th grade.  She is presently homeless, unemployed.  CPS has been involved.        MENTAL STATUS EXAMINATION:  The patient is a 27-year-old  female who appears under the influence.  She is disheveled, adequate grooming, adequate hygiene, maintains good eye contact, irritable.  Affect is congruent.  Speech is spontaneous.  Speech is low volume and mumbles her words, less logical in thinking, no loose association.  Insight and judgment are limited.  Alert and oriented x3.  Recent and remote memory, language, fund of knowledge are all less than adequate.  Does not have any suicidal or homicidal ideation, plan or intent.        DIAGNOSES:    Axis I:      1.  Opiate use disorder.    2.  Methamphetamine use disorder.    3.  Pregnancy.        PLAN:  The patient will be detoxed off heroin.  U-tox was positive for meth, symptomatic management.  The patient will be seen by case management and Internal Medicine.   We will put her on Subutex.  The patient may need it.  The patient is willing to do treatment.  We will try to get collateral information and go from there.            CELESTE JONES MD         Collateral Contacts     Name:    Chica Saez   Relationship:    Mother   Phone Number:    [phone]   Releases:    Yes     Mother reports that she plans to pick Pt up from  detox as soon as she is able to discharge.  Mother lives in [place].  Mother very concerned that Pt will leave before they can get her to a safe place.  Mother is very concerned that boyfriend is going to try to get Pt diverted away from her care. Mother plans to get a restraining order against pt bf.   Mom reports pt has a hx of anxiety and was on medications for it that were helping but stops taking them.  Mother reports her daughter has been using for at least the last 6 years.  Her use increased after the death of her father.  She believes her daughter is experiencing grief and loss and is isolated.  Mom feels her daughter struggles cognitively and has trouble focusing, and sometimes processing information.  Mother reports Pt's primary drug of choice is methamphetamine.    ollateral Contacts      A problematic pattern of alcohol/drug use leading to clinically significant impairment or distress, as manifested by at least two of the following, occurring within a 12-month period:    Alcohol/drug is often taken in larger amounts or over a longer period than was intended.  There is a persistent desire or unsuccessful efforts to cut down or control alcohol/drug use  A great deal of time is spent in activities necessary to obtain alcohol, use alcohol, or recover from its effects.  Craving, or a strong desire or urge to use alcohol/drug  Recurrent alcohol/drug use resulting in a failure to fulfill major role obligations at work, school or home.  Continued alcohol use despite having persistent or recurrent social or interpersonal problems caused or exacerbated by the effects of alcohol/drug.  Important social, occupational, or recreational activities are given up or reduced because of alcohol/drug use.  Recurrent alcohol/drug use in situations in which it is physically hazardous.  Alcohol/drug use is continued despite knowledge of having a persistent or recurrent physical or psychological problem that is likely to have  been caused or exacerbated by alcohol.  Tolerance, as defined by either of the following: A need for markedly increased amounts of alcohol/drug to achieve intoxication or desired effect. and A markedly diminished effect with continued use of the same amount of alcohol/drug.  Withdrawal, as manifested by either of the following: The characteristic withdrawal syndrome for alcohol/drug (refer to Criteria A and B of the criteria set for alcohol/drug withdrawal). and Alcohol/drug (or a closely related substance, such as a benzodiazepine) is taken to relieve or avoid withdrawal symptoms.      Specify if: In early remission:  After full criteria for alcohol/drug use disorder were previously met, none of the criteria for alcohol/drug use disorder have been met for at least 3 months but for less than 12 months (with the exception that Criterion A4,  Craving or a strong desire or urge to use alcohol/drug  may be met).     In sustained remission:   After full criteria for alcohol use disorder were previously met, non of the criteria for alcohol/drug use disorder have been met at any time during a period of 12 months or longer (with the exception that Criterion A4,  Craving or strong desire or urge to use alcohol/drug  may be met).   Specify if:   This additional specifier is used if the individual is in an environment where access to alcohol is restricted.    Mild: Presence of 2-3 symptoms    Moderate: Presence of 4-5 symptoms    Severe: Presence of 6 or more symptoms

## 2017-01-20 NOTE — PROGRESS NOTES
"[facility], [place]   Psychiatric Progress Note    Interim history   This is a 27 year old female with OPIATE DEPENDENCE Amphetamine DEPENDENCE.Pt seen in rounds. Patient's mood is IRRITABLE   overnight had parnoia , resolving now   Energy Level:MODERATE  Sleep:No concerns, sleeps well through night  Appetite:normal motivation interest fair    deneid any Suicidal/homicidal ideation/plan intent.  deneid any psychosis  No prior suicde attempts  No access to gun  Pt is in detox  Pt mssa/cows score are monitered  Tolerating meds and has no side effects.              Medications:     Current Facility-Administered Medications   Medication     buprenorphine (SUBUTEX) sublingual tablet 2 mg     folic acid (FOLVITE) tablet 1 mg     prenatal multivitamin  plus iron per tablet 1 tablet     naloxone (NARCAN) injection 0.1-0.4 mg             Allergies:   No Known Allergies         Psychiatric Examination:   Blood pressure 106/69, pulse 84, temperature 97.3  F (36.3  C), temperature source Oral, resp. rate 16, height 1.676 m (5' 6\"), weight 77.111 kg (170 lb).  Weight is 170 lbs 0 oz  Body mass index is 27.45 kg/(m^2).    Appearance:  awake, alert and adequately groomed  Attitude:  cooperative  Eye Contact:  good  Mood:  anxious  Affect:  appropriate and in normal range and mood congruent  Speech:  clear, coherent rate /rhythm are good  Psychomotor Behavior:  no evidence of tardive dyskinesia, dystonia, or tics and intact station, gait and muscle tone  Throught Process:  logical  Associations:  no loose associations  Thought Content:  no evidence of suicidal ideation or homicidal ideation, no evidence of psychotic thought, no auditory hallucinations present and no visual hallucinations present  Insight:  good  Judgement:  intact  Oriented to:  time, person, and place  Attention Span and Concentration:  intact  Recent and Remote Memory:  intact  Language fund of knowledge are adequate         Labs: "     Recent Results (from the past 24 hour(s))   CBC with platelets differential    Collection Time: 01/20/17  6:28 AM   Result Value Ref Range    WBC 9.0 4.0 - 11.0 10e9/L    RBC Count 3.95 3.8 - 5.2 10e12/L    Hemoglobin 11.9 11.7 - 15.7 g/dL    Hematocrit 34.8 (L) 35.0 - 47.0 %    MCV 88 78 - 100 fl    MCH 30.1 26.5 - 33.0 pg    MCHC 34.2 31.5 - 36.5 g/dL    RDW 12.9 10.0 - 15.0 %    Platelet Count 306 150 - 450 10e9/L    Diff Method Automated Method     % Neutrophils 63.0 %    % Lymphocytes 27.3 %    % Monocytes 7.9 %    % Eosinophils 1.4 %    % Basophils 0.2 %    % Immature Granulocytes 0.2 %    Nucleated RBCs 0 0 /100    Absolute Neutrophil 5.7 1.6 - 8.3 10e9/L    Absolute Lymphocytes 2.5 0.8 - 5.3 10e9/L    Absolute Monocytes 0.7 0.0 - 1.3 10e9/L    Absolute Eosinophils 0.1 0.0 - 0.7 10e9/L    Absolute Basophils 0.0 0.0 - 0.2 10e9/L    Abs Immature Granulocytes 0.0 0 - 0.4 10e9/L    Absolute Nucleated RBC 0.0    Comprehensive metabolic panel    Collection Time: 01/20/17  6:28 AM   Result Value Ref Range    Sodium 140 133 - 144 mmol/L    Potassium 3.5 3.4 - 5.3 mmol/L    Chloride 109 94 - 109 mmol/L    Carbon Dioxide 25 20 - 32 mmol/L    Anion Gap 6 3 - 14 mmol/L    Glucose 76 70 - 99 mg/dL    Urea Nitrogen 7 7 - 30 mg/dL    Creatinine 0.66 0.52 - 1.04 mg/dL    GFR Estimate >90  Non  GFR Calc   >60 mL/min/1.7m2    GFR Estimate If Black >90   GFR Calc   >60 mL/min/1.7m2    Calcium 8.6 8.5 - 10.1 mg/dL    Bilirubin Total 0.3 0.2 - 1.3 mg/dL    Albumin 2.7 (L) 3.4 - 5.0 g/dL    Protein Total 6.4 (L) 6.8 - 8.8 g/dL    Alkaline Phosphatase 68 40 - 150 U/L    ALT 30 0 - 50 U/L    AST 19 0 - 45 U/L   TSH with free T4 reflex and/or T3 as indicated    Collection Time: 01/20/17  6:28 AM   Result Value Ref Range    TSH 1.18 0.40 - 4.00 mU/L   GGT    Collection Time: 01/20/17  6:28 AM   Result Value Ref Range    GGT 9 0 - 40 U/L            OPIATE DEPENDENCE Amphetamine  "DEPENDENCE  Pregnancy   plan   will order subutex 2mg sl qid  Pt paranoia is resolving  Pt wants to do trt  Will inform cps    Laboratory/Imaging: reviewed with patient   Consults: internal medicine consult reviewed  Patient will be treated in therapeutic milieu with appropriate individual and group therapies as described.      Medical diagnoses to be addressed this admission:    Plan:  Ob consult  \"-She will need a Level II ultrasound between 18-20 weeks gestational age due to her history of drug abuse in pregnancy. This can be obtained as an outpatient. If she remains inpatient past 18wga, ultrasound can be obtained during this admission  -If pt is interested in genetic testing, please re-consult OB/GYN team and we will place the appropriate orders.  -Follow up for routine prenatal care after discharge. \"  Relevant psychosocial stressors: homeless, lacks sober support    Legal Status: voluntary    Safety Assessment:   Checks:  15 min  Precautions: withdrawal precautions  Pt has not required locked seclusion or restraints in the past 24 hours to maintain safety, please refer to RN documentation for further details.    Able to give informed consent:  YES   Discussed Risks/Benefits/Side Effects/Alternatives: YES    After discussion of the indications, risks, benefits, alternatives and consequences of no treatment, the patient elects to complete detox and do trt  "

## 2017-01-20 NOTE — PROGRESS NOTES
Spoke with pt's mother via phone.  (ACRLA in chart). Mother reports that she plans to pick Pt up from detox as soon as she is able to discharge.  Mother lives in [place].  Mother very concerned that Pt will leave before they can get her to a safe place.  Mother is very concerned that boyfriend is going to try to get Pt diverted away from her care. Mother plans to get a restraining order against Pt's bf.   Mom reports Pt has a hx of anxiety and was on medications for it that were helping but stops taking them.  Mother reports her daughter has been using for at least the last 6 years.  Her use increased after the death of her father.  She believes her daughter is experiencing grief and loss and is isolated.  Mom feels her daughter struggles cognitively and has trouble focusing, and sometimes processing information.  Mother reports Pt's primary drug of choice is methamphetamine.  Mother is requesting Pt find treatment program in or near [place].  Nearest facilities are [facility] and [facility]. Will complete assessment and fax for referral.

## 2017-01-20 NOTE — PROGRESS NOTES
The patient has been in the milieu and she has attended programming.  She came to the writer and said that she wanted to have a roommate and that she wanted to transfer to a different detox.  She could not say why she didn't feel safe here.  She was moved in to a room with a roommate and she said that she felt better.  She also said that her mother said that her baby's father was threatening her family and that she was going to come to get the patient tonight or tomorrow.  It was explained to the patient that her mother should call the police if she feels threatened and that it would be up to the doctor if she was to be discharged.  The patient's speech is halting at times and it can be difficult to follow her train of thought.  She was seen by OB.  It was reported that the fetal heart tones were normal and that they recommend that the patient get a level 2 ultrasound at 18-20 weeks.

## 2017-01-20 NOTE — PLAN OF CARE
Problem: Substance Withdrawal  Goal: Substance Withdrawal  Signs and symptoms of listed problems will be absent or manageable. 1) Patient will achieve medical stabilization of acute withdrawal sx.  2) Patient will remain safe and free from injury  3) Patient will demonstrate improvement of ADLs (appetite, hygiene)  4) Verbalize reduction of fear or anxiety to a manageable level.  5) Verbalize knowledge of substance abuse as a disease  6) Verbalize risks and negative effects related to drug ingestion  7) Demonstrate participation in unit programming and attends specific substance use group therapy (i.e AA meetings)  8) Accept referral to substance abuse treatment  9) Express sense of regaining some control of situation/life (possible by verbalizing alternative coping mechanisms as alternatives to substance use in response to stress)  Outcome: No Change  Pt being monitored for opiate withdrawal. Pt's cows score at 10 am 13. Pt started on buprenorphine 2 mg QID. Pt was very restless on unit.Reported her mood as irritable.  Pt seemed slightly paranoid looking around the unit. After receiving first dose of buprenorphine pt laid down to rest and slept much of am. Pt's mood appears calmer after dose of buprenorphine. Pt states when she found she was pregnant with her first two pregnancy she was able to stop using drugs during the pregnancy. This pregnancy patient states she has been unable to quit using drugs. Pt states her BF brought her here and is supportive. No report of vaginal bleeding or contractions.

## 2017-01-20 NOTE — PROGRESS NOTES
Pt's mother Chica Saez [phone] called to the unit. She states she has been waiting for years for Adrianne to contact her for help and treatment. Chica states that Adrianne needs to get out of the cities and away from her BF Teofilo who also uses drugs and is not a good influence on Adrianne. Chica reports she is working on getting Adrianne a bed at a treatment center in [place] called [facility]. Informed mother to make sure they accept pt on buprenorphine. Chica was also provided unit number, patient phone number and contact for [facility] . Dr. Jones and [facility] were updated regarding the above.

## 2017-01-20 NOTE — PROGRESS NOTES
Assessment completed and faxed to [facility].  [facility] sent in basket for LP.  Will consider options on Monday 1/23/17.

## 2017-01-20 NOTE — PROGRESS NOTES
Pt signed CARLA for mother, Chica (in chart).  Call placed but no answer.  Per medical record Mother is working on placement for PT in residential treatment program in [place], [place].  Will complete assessment and continue to attempt to reach mother.

## 2017-01-20 NOTE — CONSULTS
Internal Medicine Consult  Department of Internal Medicine    Patient Name: Adrianne Saez MRN# [id number]   Age: 27 year old YOB: 1990     Date of Admission:1/19/2017    Primary care provider: Katheryn Montano  Date of Service: 1/20/2017      Referring MD & Reason for Visit:  Vita Jones MD, requesting consultation for opiate dependence.       ASSESSMENT & PLAN:  Adrianne Saez is a 27 year old female with hx anxiety, opiate dependence, and currently 18 weeks pregnant who was admitted to [facility] for detox.    Opiate dependence:  Will defer to primary team.     Incidental pregnancy:  OB/GYN consulted, will defer management.     Anxiety:  Stable. Will defer to psychiatry.     Tobacco abuse:  Encouraged completed cessation d/t effects on fetus, even transdermal.        I personally examined Adrianne today, and reviewed vital signs, medications and pertinent labs or imaging.  Thank you for this opportunity to be involved in your patient's care.    Mehul Beltran PA-C  Internal Medicine [facility] Hospitalist  [facility]  Pager [phone]    ---------------------------------------------------------------------------------------------------------------------    HPI:  Adrianne Saez is a 27 year old female with hx anxiety and heroin abuse, currently 18 weeks pregnant, who was admitted to unit 3A for detox.     The patient reports a long history of drug use, including daily heroin use in the past. Currently she reports only snorting heroin on the weekends. She typically buys $50 worth every weekend and abstains during the week due to her boyfriend. No IVDU. She denies any other drug or alcohol use. Currently no withdrawal symptoms. She is approximately 18 weeks pregnant. She is taking a prenatal vitamin.     She denies any other complaints.     ROS:  A complete ROS was performed and is negative other than what is stated in the HPI.    Past Medical History:  Past Medical History   Diagnosis Date  "    Frequent UTI      Vaginitis and vulvovaginitis, unspecified      Recurrent bacterial vaginosis     Anxiety      Heroin abuse        Past Surgical History:  Past Surgical History   Procedure Laterality Date     Hernia repair  Age 7        Social History:  Social History   Substance Use Topics     Smoking status: Current Every Day Smoker -- 0.50 packs/day for 6 years     Smokeless tobacco: Never Used     Alcohol Use: Yes      Comment: rare       Family History:  Family History   Problem Relation Age of Onset     Hypertension Mother        Allergies:   No Known Allergies    Medications:  Prescriptions prior to admission   Medication Sig Dispense Refill Last Dose     GABAPENTIN PO Take 300 mg by mouth 3 times daily    Past Week at Unknown time     AMITRIPTYLINE HCL PO Take 50 mg by mouth At Bedtime    1/18/2017 at Unknown time     Prenatal Vit-Fe Fumarate-FA (PRENATAL MULTIVITAMIN  PLUS IRON) 27-0.8 MG TABS per tablet Take 1 tablet by mouth daily   Past Week at Unknown time     NO ACTIVE MEDICATIONS             Physical Exam:  /85 mmHg  Pulse 70  Temp(Src) 97  F (36.1  C) (Oral)  Resp 16  Ht 1.676 m (5' 6\")  Wt 77.111 kg (170 lb)  BMI 27.45 kg/m2     GENERAL: Well developed, well nourished appearing female who appears their stated age.  Alert and oriented x 3. NAD.   HEENT: No scleral icterus. Mucous membranes moist.   CV: RRR. S1, S2. No murmurs appreciated.   RESPIRATORY: Lungs CTAB with no wheezing, rales, rhonchi.   GI: Abdomen soft and non distended with normoactive bowel sounds present in all quadrants. No tenderness, rebound, guarding. No mass or HSM.    NEUROLOGICAL: No focal deficits. CN II-XII intact grossly. Moves all extremities.    EXTREMITIES: No peripheral edema. Intact bilateral pedal pulses.   SKIN: No jaundice. No rashes.     Data:    ROUTINE IP LABS (Last four results)  CMP   Recent Labs  Lab 01/20/17  0628      POTASSIUM 3.5   CHLORIDE 109   CO2 25   ANIONGAP 6   GLC 76   BUN 7 "   CR 0.66   VLAD 8.6   PROTTOTAL 6.4*   ALBUMIN 2.7*   BILITOTAL 0.3   ALKPHOS 68   AST 19   ALT 30     CBC   Recent Labs  Lab 01/20/17  0628   WBC 9.0   RBC 3.95   HGB 11.9   HCT 34.8*   MCV 88   MCH 30.1   MCHC 34.2   RDW 12.9        INR No lab results found in last 7 days.    All labs personally reviewed in Epic.  See HPI for more pertinent results.     No results found for this or any previous visit (from the past 48 hour(s)).

## 2017-01-21 PROCEDURE — 25000132 ZZH RX MED GY IP 250 OP 250 PS 637: Performed by: PSYCHIATRY & NEUROLOGY

## 2017-01-21 PROCEDURE — 12800008 ZZH R&B CD ADULT

## 2017-01-21 RX ADMIN — NICOTINE 1 PATCH: 21 PATCH, EXTENDED RELEASE TRANSDERMAL at 08:52

## 2017-01-21 RX ADMIN — FOLIC ACID 1 MG: 1 TABLET ORAL at 08:52

## 2017-01-21 RX ADMIN — PRENATAL VIT W/ FE FUMARATE-FA TAB 27-0.8 MG 1 TABLET: 27-0.8 TAB at 08:52

## 2017-01-21 ASSESSMENT — ACTIVITIES OF DAILY LIVING (ADL)
DRESS: INDEPENDENT
GROOMING: INDEPENDENT
ORAL_HYGIENE: INDEPENDENT

## 2017-01-21 NOTE — PLAN OF CARE
"Problem: Substance Withdrawal  Goal: Substance Withdrawal  Signs and symptoms of listed problems will be absent or manageable. 1) Patient will achieve medical stabilization of acute withdrawal sx.  2) Patient will remain safe and free from injury  3) Patient will demonstrate improvement of ADLs (appetite, hygiene)  4) Verbalize reduction of fear or anxiety to a manageable level.  5) Verbalize knowledge of substance abuse as a disease  6) Verbalize risks and negative effects related to drug ingestion  7) Demonstrate participation in unit programming and attends specific substance use group therapy (i.e AA meetings)  8) Accept referral to substance abuse treatment  9) Express sense of regaining some control of situation/life (possible by verbalizing alternative coping mechanisms as alternatives to substance use in response to stress)  Outcome: Improving  Patient presents with flat affect, mood is calm and overall pleasant. Patient continues to decline buprenorphine, stating \"I don't need it\". COWS scores today 0 and 1, VSS. Spent the morning resting in her room, denies any pain or other physical concerns. Denies SI/SIB. States she would like to [facility], or any other treatment in the [place] area, states \"my family is here\" and states she has good support here. Patient is willing to stay here through the weekend and leave on Monday if possible. No other concerns at this time. Will continue to monitor.         "

## 2017-01-21 NOTE — PROGRESS NOTES
Pt was visible in the milieu for most of the shift. Attended the AA meeting. Watched TV with peers. Was pleasant, cooperative. Vital signs WDL. No bleeding or contractions reported. Pt denied any physical discomfort.

## 2017-01-22 PROCEDURE — 25000132 ZZH RX MED GY IP 250 OP 250 PS 637: Performed by: PSYCHIATRY & NEUROLOGY

## 2017-01-22 PROCEDURE — 12800008 ZZH R&B CD ADULT

## 2017-01-22 RX ORDER — HYDROXYZINE HYDROCHLORIDE 25 MG/1
25-50 TABLET, FILM COATED ORAL EVERY 4 HOURS PRN
Status: DISCONTINUED | OUTPATIENT
Start: 2017-01-22 | End: 2017-01-23 | Stop reason: HOSPADM

## 2017-01-22 RX ORDER — BUPRENORPHINE 2 MG/1
2 TABLET SUBLINGUAL 2 TIMES DAILY
Status: DISCONTINUED | OUTPATIENT
Start: 2017-01-22 | End: 2017-01-23 | Stop reason: HOSPADM

## 2017-01-22 RX ADMIN — NICOTINE 1 PATCH: 21 PATCH, EXTENDED RELEASE TRANSDERMAL at 08:18

## 2017-01-22 RX ADMIN — FOLIC ACID 1 MG: 1 TABLET ORAL at 08:19

## 2017-01-22 RX ADMIN — PRENATAL VIT W/ FE FUMARATE-FA TAB 27-0.8 MG 1 TABLET: 27-0.8 TAB at 08:19

## 2017-01-22 RX ADMIN — HYDROXYZINE HYDROCHLORIDE 50 MG: 25 TABLET ORAL at 16:19

## 2017-01-22 RX ADMIN — HYDROXYZINE HYDROCHLORIDE 50 MG: 25 TABLET ORAL at 20:00

## 2017-01-22 ASSESSMENT — ACTIVITIES OF DAILY LIVING (ADL)
GROOMING: INDEPENDENT
LAUNDRY: WITH SUPERVISION
DRESS: INDEPENDENT
ORAL_HYGIENE: INDEPENDENT

## 2017-01-22 NOTE — PROGRESS NOTES
The patient states that she is doing ok.  She denies any thoughts of self harm.  She is hoping to go to [facility] upon discharge.  The patient refused to take the Buprenorphine stating that she was not having withdrawal.  She scored a 0 and a 1 on her COWS.

## 2017-01-22 NOTE — PROGRESS NOTES
Patient has been out and about on the unit. She tells me that she wants a Rule 25 and that she is interested in out-patient treatment. Encouraged patient to talk with her  tomorrow and that staff could help her with the Rule 25 and with finding out-patient treatment. Patient states that she does not want any more Buprenorphine. Her Opiate withdrawal S/S are minimal.

## 2017-01-23 VITALS
WEIGHT: 170 LBS | TEMPERATURE: 97.9 F | HEIGHT: 66 IN | HEART RATE: 67 BPM | RESPIRATION RATE: 16 BRPM | SYSTOLIC BLOOD PRESSURE: 114 MMHG | BODY MASS INDEX: 27.32 KG/M2 | DIASTOLIC BLOOD PRESSURE: 76 MMHG

## 2017-01-23 PROCEDURE — 25000125 ZZHC RX 250: Performed by: PSYCHIATRY & NEUROLOGY

## 2017-01-23 PROCEDURE — 25000132 ZZH RX MED GY IP 250 OP 250 PS 637: Performed by: PSYCHIATRY & NEUROLOGY

## 2017-01-23 PROCEDURE — 99239 HOSP IP/OBS DSCHRG MGMT >30: CPT | Performed by: PSYCHIATRY & NEUROLOGY

## 2017-01-23 PROCEDURE — 25900015 H RX 259: Performed by: PSYCHIATRY & NEUROLOGY

## 2017-01-23 PROCEDURE — 99232 SBSQ HOSP IP/OBS MODERATE 35: CPT | Performed by: FAMILY MEDICINE

## 2017-01-23 RX ORDER — FOLIC ACID 1 MG/1
1 TABLET ORAL DAILY
Qty: 30 TABLET | Refills: 3 | Status: SHIPPED | OUTPATIENT
Start: 2017-01-23

## 2017-01-23 RX ORDER — BUPRENORPHINE 2 MG/1
2 TABLET SUBLINGUAL DAILY
Qty: 30 TABLET | Refills: 0 | Status: SHIPPED | OUTPATIENT
Start: 2017-01-23

## 2017-01-23 RX ORDER — PRENATAL VIT/IRON FUM/FOLIC AC 27MG-0.8MG
1 TABLET ORAL DAILY
Qty: 100 TABLET | Refills: 1 | Status: SHIPPED | OUTPATIENT
Start: 2017-01-23

## 2017-01-23 RX ADMIN — BUPRENORPHINE HCL 2 MG: 2 TABLET SUBLINGUAL at 09:50

## 2017-01-23 RX ADMIN — FOLIC ACID 1 MG: 1 TABLET ORAL at 08:24

## 2017-01-23 RX ADMIN — PRENATAL VIT W/ FE FUMARATE-FA TAB 27-0.8 MG 1 TABLET: 27-0.8 TAB at 08:24

## 2017-01-23 RX ADMIN — NICOTINE 1 PATCH: 21 PATCH, EXTENDED RELEASE TRANSDERMAL at 08:24

## 2017-01-23 RX ADMIN — HYDROXYZINE HYDROCHLORIDE 50 MG: 25 TABLET ORAL at 09:50

## 2017-01-23 RX ADMIN — ONDANSETRON 4 MG: 4 TABLET, ORALLY DISINTEGRATING ORAL at 10:07

## 2017-01-23 ASSESSMENT — ACTIVITIES OF DAILY LIVING (ADL)
ORAL_HYGIENE: INDEPENDENT
ORAL_HYGIENE: INDEPENDENT
DRESS: INDEPENDENT
GROOMING: INDEPENDENT
LAUNDRY: WITH SUPERVISION
GROOMING: INDEPENDENT
DRESS: INDEPENDENT
LAUNDRY: WITH SUPERVISION
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
## 2017-01-23 NOTE — DISCHARGE SUMMARY
Patient discharged at 1700 to home. Patient picked up by Mother. All patient belongings from room and locker sent with patient. Day RNKelsey,went over discharge instructions, teachings, patient's labs, medications being sent by pharmacy for patient and unit recommendations with patient (see note).  Patient verbalizes and demonstrates understanding of all teachings. Patient denies thoughts of harm towards self or others. Patient denies auditory/visual hallucinations. Pt denies any current medication side effect or medical issues at this time. Patient denies any further questions and is now discharged at this time.

## 2017-01-23 NOTE — DISCHARGE INSTRUCTIONS
Behavioral Discharge Planning and Instructions  THANK YOU FOR CHOOSING THE [address]  [phone]    Summary: You were admitted to [facility] on 1/19/17 for detoxification from opiates.  A medical exam was performed that included lab work. You have met with a  and opted to continue in OP treatment and Suboxone maintenance.  Please take care and make your recovery a priority, Adrianne!     Suboxone maintenance:   Dr. Ramon Aquino  [facility]  [address] [place] [address]  Phone:  [phone]  Appointment:  Monday January 30th.  The time of the appointment is unknown.  If we cannot get the time prior to your discharge, you are to call the clinic tomorrow to confirm this.     Orientation for treatment at [facility]:    Monday January 30th at 7:30 PM  [address]  Room [address] (next door to [employer])  **If you are unable to attend orientation you must reschedule by calling [phone]** You MUST attend orientation before you can begin treatment.    You have been referred to [facility].  Please contact them with your new phone information at [phone].   [phone]      Main Diagnoses:  Per Dr. Jones:  1.  Opiate use disorder.    2.  Methamphetamine use disorder.    3.  Pregnancy.      Major Treatments, Procedures and Findings:  You were treated for opiate detoxification using Suboxone.  As an outpatient you will be prescribed Suboxone, please take this medication as prescribed until it is gone. You have had a chemical dependency assessment.  You have had labs drawn and those results have been reviewed with you. Please take a copy of your lab work with you to your next primary care physician appointment.    Symptoms to Report:  If you experience more anxiety, confusion, sleeplessness, deep sadness or thoughts of suicide, notify your treatment team or notify your primary care physician. IF ANY OF THE  SYMPTOMS YOU ARE EXPERIENCING ARE A MEDICAL EMERGENCY CALL 911 IMMEDIATELY.     Lifestyle Adjustment: Adjust your lifestyle to get enough sleep, relaxation, exercise and good nutrition. Continue to develop healthy coping skills to decrease stress and promote a sober living environment. Do not use mood altering substances including alcohol, illegal drugs or addictive medications other than what is currently prescribed.     Follow-up Appointment:   [facility]   [address]  [place], [place]  Phone:  [phone]  Appointment:  Monday January 30 th at 1:00 pm  Request a level 2 Ultrasound    Resources:   http://www.aastpaul.org/?topic=8  http://a[place].org/meetings/  http://www.nam[place].org/index.php/meeting-list-pdf  http://www.[employer]over[employer] Communicationsreduction.org  [facility] 665-378-6633  Support Group:  ALMA/ANDRÉS and Sponsor/support  Crisis Intervention: 551.939.4530 or 304-133-8627 (TTY: 434.471.7399).  Call anytime for help.  National [place] on Mental Illness (www.[place].marie.org): 450.476.4631 or 609-897-7959.  Alcoholics Anonymous (www.alcoholics-anonymous.org): Check your phone book for your local chapter.  Suicide Awareness Voices of Education (SAVE) (www.save.org): 301-003-SDDC (2483)  National Suicide Prevention Line (www.mentalhealthmn.org): 063-625-KPRJ (3566)  Mental Health Consumer/Survivor Network of [place] (www.mhcsn.net): 264.127.1031 or 210-754-2888  Mental Health Association of [place] (www.mentalhealth.org): 574.957.1890 or 377-017-9565   Substance Abuse and Mental Health Services (www.samhsa.gov)    [place] Recovery Connection ([facility])  [facility] connects people seeking recovery to resources that help foster and sustain long-term recovery.  Whether you are seeking resources for treatment, transportation, housing, job training, education, health care or other pathways to recovery, [facility] is a great place to start.  217.398.9332.  www.[facility]y.org    General Medication  Instructions:   See your medication sheet(s) for instructions.   Take all medicines as directed.  Make no changes unless your doctor suggests them.   Go to all your doctor visits.  Be sure to have all your required lab tests. This way, your medicines can be refilled on time.  AA/NA and Sponsors are excellent resources for support and you can find one at any support group meeting.  Do not use any form of alcohol.    Please Note:  If you have any questions at anytime after you are discharged please call the [facility] [place] detox unit 3AW unit at [phone].  [facility], Behavioral Intake [phone]  Please take this discharge folder with you to all your follow up appointments, it contains your lab results, diagnosis, medication list and discharge recommendations.      THANK YOU FOR CHOOSING THE [facility]     The treatment team has appreciated the opportunity to work with you.  We wish you the best in the future.

## 2017-01-23 NOTE — PROGRESS NOTES
Patient spoke with this RN, and she has made an appointment with Dr. Aquino for Suboxone maintenance Thursday, January 26th at 1:20pm. All other appointments made remain intact and on the same dates/times.     Patient reports she feels hopeful for the future. She feels anxious, but ready to go. Patient reports she feels safe to discharge. Pt denies thoughts of harm towards self or others.

## 2017-01-23 NOTE — PROGRESS NOTES
Pt was set up to attend orientation for EOP at [facility] on 1/30/17.  A referral was made to [facility] and her contact information was updated with them.  Contacted Dr. Aquino's office.  They need his approval in order to make a Suboxone maintenance appointment.  Request made and they will contact this writer when answered.  All contact information has been provided to Pt in her discharge instructions.

## 2017-01-23 NOTE — CONSULTS
DATE OF CONSULTATION:  2017.      HISTORY OF PRESENT ILLNESS:  Sander Luciano is a 27-year-old female admitted to [facility].  She is 17 weeks pregnant.  She has been using heroin, Percocet and methamphetamine.  She states that she never uses enough opiates to have withdrawal when she tries to stop.  She uses on weekends only and not every weekend.  She snorts the heroin.  She says she uses Percocet, but rarely.  She has never been physically addicted and has never had withdrawal.  She has no craving.  We discussed the pros and cons of opioid maintenance treatment for her addiction.  She does not need it to avoid withdrawal.  She was advised that her chance of relapse would be greater without medication assisted treatment, but she is convinced that if she gets to a safe place, gets away from people who were a bad influence on her, goes to outpatient treatment and participates in a recovery program, she can stay sober.  She does not want to have to depend on any medications to stay sober.  She does not want to have to be withdrawn from a medication in the future.      We extensively discussed the pros and cons of opioid maintenance therapy.  She was advised that it may reduce her craving and increase her recovery chances.  She would still prefer to not take any buprenorphine.  At this point, I believe that it is safe to give her a chance at abstinence base recovery.  Hopefully, she will be entering a strong treatment program and develop a motivation with a good recovery attitude.      Thank you for asking me to see this patient.  I will see her again if necessary.         MEAGAN RYAN MD             D: 2017 08:45   T: 2017 09:16   MT: SATYA      Name:     SANDER LUCIANO   MRN:      [id number]        Account:       [id number]   :      1990           Consult Date:  2017      Document: [id number]

## 2017-01-23 NOTE — PROGRESS NOTES
The patient has been in the milieu.  She is very anxious for discharge.  The discharge instructions were given and were explained to the patient who stated that she understood them.  She denies that she has any thoughts of self harm and she states that she has support in the community.  The writer and the patient continue to try to reach Dr. Ramon Aquino to confirm the appointment or to change the time.  If this cannot be done she will change the time of her OB appointment.  The writer is hoping to hear from Dr. Aquino's office, but if not the patient will follow up.

## 2017-01-23 NOTE — PROGRESS NOTES
Met with Pt and completed assessment.  Pt was interviewed by Mike at Pre-petition screening today and they believe they will have a decision by days end.  Pt teary and hopeful that [facility] so she can look for work and keep her apartment.

## 2017-01-24 NOTE — DISCHARGE SUMMARY
More than 35 minutes spent on this summary, doing the discharge instructions, discharge mental status examination.      DISCHARGE DIAGNOSES:   Axis I:     1.  Opiate use disorder.   2.  Methamphetamine use disorder.   3.  Pregnancy.      IDENTIFYING INFORMATION:  Adrianne Saez is a 27-year-old  female admitted for detox.  Please review detailed admission note by Dr. Jones on 01/19/2017.      HOSPITAL COURSE:  During the hospitalization, the patient was detoxed off of meth symptomatically.  She was on  Subutex, she was on 2 mg twice a day.  She took it for 1 day and then stopped taking it.  The patient during the hospitalization was seen Mehul Beltran on 01/20/2017 for Internal Medicine consult.  She was also seen by OB/GYN consult on her.  Initially the patient did not want to be on Suboxone maintenance during pregnancy.  The patient had a second opinion with Dr. RYAN  After discussing with being on Suboxone during pregnancy is a good option to prevent relapse, helps with craving, agreed to take it.  She met with the  and did the assessment and she is going to do outpatient here.  She had lab work done, which shows normal comprehensive metabolic panel, albumin is 2.7, protein is 6.4, hematocrit is 34.8.  Otherwise, CBC, CMP normal.  During the hospitalization, the patient's energy, motivation, sleep and interest improved.  Her detox was only positive for amphetamines.  Child Protective Services was involved and [employer] was involved, they took the contact information.      DISCHARGE DISPOSITION:  The patient going to her sister-in-law's house.      DISCHARGE FOLLOWUP:   1.  Suboxone 2 mg daily.   2.  Folic acid, prenatal.   3.  Patient has an appointment with [facility] on 01/30/2017.  According to Dr. Ryan, she is to follow up with Dr. Darling Green who starts taking Suboxone patients in 2 weeks.  However, the patient in the meantime has a month supply.  She also is going to  see Dr. Aquino at [facility].  They have not confirmed the date yet.  She has a month's supply.  If there is a hiccup, she is to contact the , Silvina or me on 3A to help give her.  Will also try to facilitate her to see Dr. Darling Green.  Will communicate this with [facility].  The patient at this time does not have any suicidal or homicidal ideation, plan or intent.      DISCHARGE DISPOSITION:  The patient going home.      DISCHARGE MENTAL STATUS EXAMINATION:  The patient is alert, oriented x3.  Recent and remote memory, language, fund of knowledge are all adequate.  Mood is euthymic.  Affect is congruent.  Speech is spontaneous, normal rate.  Does not have any suicidal or homicidal ideation, denied auditory or visual hallucinations.  Alert and oriented x3.  Recent and remote memory, language, fund of knowledge are all adequate.  The patient will have a followup appointment with Dr. Aquino at [facility].  She has not done this yet.  She is encouraged to do so.  If that does not work, she will see Dr. Darling Green.       Suboxone maintenance:   Dr. Ramon Aquino   [facility]   [address] [place] [address]   Phone: [phone]   Appointment: .   The time of the appointment is unknown. If we cannot get the time prior to your discharge, you are to call the clinic tomorrow to confirm this.   Orientation for treatment at [facility]:    at 7:30 PM   [address] [place] [address]    CELESTE JONES MD             D: 2017 12:33   T: 2017 15:00   MT: CK      Name:     SANDER LUCIANO   MRN:      [id number]        Account:        [id number]   :      1990           Admit Date:                                       Discharge Date: 2017      Document: [id number]